# Patient Record
Sex: MALE | Race: WHITE | Employment: UNEMPLOYED | URBAN - METROPOLITAN AREA
[De-identification: names, ages, dates, MRNs, and addresses within clinical notes are randomized per-mention and may not be internally consistent; named-entity substitution may affect disease eponyms.]

---

## 2019-04-13 ENCOUNTER — HOSPITAL ENCOUNTER (EMERGENCY)
Age: 29
Discharge: HOME | End: 2019-04-13
Attending: EMERGENCY MEDICINE

## 2019-04-13 VITALS
WEIGHT: 155 LBS | HEART RATE: 112 BPM | BODY MASS INDEX: 23.49 KG/M2 | DIASTOLIC BLOOD PRESSURE: 77 MMHG | SYSTOLIC BLOOD PRESSURE: 119 MMHG | HEIGHT: 68 IN | TEMPERATURE: 97.6 F | RESPIRATION RATE: 16 BRPM | OXYGEN SATURATION: 97 %

## 2019-04-13 DIAGNOSIS — T40.1X1A ACCIDENTAL OVERDOSE OF HEROIN, INITIAL ENCOUNTER (HCC): Primary | ICD-10-CM

## 2019-04-13 LAB
ACETAMINOPHEN LEVEL: <5 MCG/ML (ref 10–30)
ALBUMIN SERPL-MCNC: 4.8 G/DL (ref 3.5–5.2)
ALP BLD-CCNC: 81 U/L (ref 40–129)
ALT SERPL-CCNC: 16 U/L (ref 0–40)
AMPHETAMINE SCREEN, URINE: POSITIVE
ANION GAP SERPL CALCULATED.3IONS-SCNC: 8 MMOL/L (ref 7–16)
AST SERPL-CCNC: 28 U/L (ref 0–39)
BARBITURATE SCREEN URINE: NOT DETECTED
BENZODIAZEPINE SCREEN, URINE: NOT DETECTED
BILIRUB SERPL-MCNC: 0.4 MG/DL (ref 0–1.2)
BUN BLDV-MCNC: 16 MG/DL (ref 6–20)
CALCIUM SERPL-MCNC: 9.4 MG/DL (ref 8.6–10.2)
CANNABINOID SCREEN URINE: NOT DETECTED
CHLORIDE BLD-SCNC: 102 MMOL/L (ref 98–107)
CO2: 27 MMOL/L (ref 22–29)
COCAINE METABOLITE SCREEN URINE: NOT DETECTED
CREAT SERPL-MCNC: 1.2 MG/DL (ref 0.7–1.2)
ETHANOL: <10 MG/DL (ref 0–0.08)
GFR AFRICAN AMERICAN: >60
GFR NON-AFRICAN AMERICAN: >60 ML/MIN/1.73
GLUCOSE BLD-MCNC: 84 MG/DL (ref 74–99)
HCT VFR BLD CALC: 44.2 % (ref 37–54)
HEMOGLOBIN: 15.4 G/DL (ref 12.5–16.5)
MCH RBC QN AUTO: 31.8 PG (ref 26–35)
MCHC RBC AUTO-ENTMCNC: 34.8 % (ref 32–34.5)
MCV RBC AUTO: 91.3 FL (ref 80–99.9)
METHADONE SCREEN, URINE: NOT DETECTED
OPIATE SCREEN URINE: NOT DETECTED
PDW BLD-RTO: 12.6 FL (ref 11.5–15)
PHENCYCLIDINE SCREEN URINE: NOT DETECTED
PLATELET # BLD: 405 E9/L (ref 130–450)
PMV BLD AUTO: 9.6 FL (ref 7–12)
POTASSIUM REFLEX MAGNESIUM: 4.2 MMOL/L (ref 3.5–5)
PROPOXYPHENE SCREEN: NOT DETECTED
RBC # BLD: 4.84 E12/L (ref 3.8–5.8)
SALICYLATE, SERUM: <0.3 MG/DL (ref 0–30)
SODIUM BLD-SCNC: 137 MMOL/L (ref 132–146)
TOTAL PROTEIN: 8.2 G/DL (ref 6.4–8.3)
TRICYCLIC ANTIDEPRESSANTS SCREEN SERUM: NEGATIVE NG/ML
WBC # BLD: 24.6 E9/L (ref 4.5–11.5)

## 2019-04-13 PROCEDURE — G0480 DRUG TEST DEF 1-7 CLASSES: HCPCS

## 2019-04-13 PROCEDURE — 99284 EMERGENCY DEPT VISIT MOD MDM: CPT

## 2019-04-13 PROCEDURE — 93005 ELECTROCARDIOGRAM TRACING: CPT

## 2019-04-13 PROCEDURE — 80307 DRUG TEST PRSMV CHEM ANLYZR: CPT

## 2019-04-13 PROCEDURE — 80053 COMPREHEN METABOLIC PANEL: CPT

## 2019-04-13 PROCEDURE — 85027 COMPLETE CBC AUTOMATED: CPT

## 2019-04-13 PROCEDURE — 36415 COLL VENOUS BLD VENIPUNCTURE: CPT

## 2019-04-13 RX ORDER — LORAZEPAM 2 MG/ML
INJECTION INTRAMUSCULAR
Status: DISCONTINUED
Start: 2019-04-13 | End: 2019-04-13 | Stop reason: WASHOUT

## 2019-04-13 RX ORDER — NALOXONE HYDROCHLORIDE 1 MG/ML
INJECTION INTRAMUSCULAR; INTRAVENOUS; SUBCUTANEOUS
Status: DISCONTINUED
Start: 2019-04-13 | End: 2019-04-13 | Stop reason: WASHOUT

## 2019-04-13 SDOH — HEALTH STABILITY: MENTAL HEALTH: HOW OFTEN DO YOU HAVE A DRINK CONTAINING ALCOHOL?: NEVER

## 2019-04-13 NOTE — ED PROVIDER NOTES
HPI:  4/13/19,   Time: 3:44 PM         Lamonte Ba is a 29 y.o. male presenting to the ED for a heroin overdose, beginning a short time   ago. The complaint has been intermittent, severe in severity, and worsened by nothing. Issue and his friends were found agonal in a car with his syringes still in their arms    ROS:   Pertinent positives and negatives are stated within HPI, all other systems reviewed and are negative.  --------------------------------------------- PAST HISTORY ---------------------------------------------  Past Medical History:  has no past medical history on file. Past Surgical History:  has a past surgical history that includes back surgery. Social History:  reports that he has been smoking cigarettes. He has been smoking about 2.00 packs per day. He has never used smokeless tobacco. He reports that he has current or past drug history. Drugs: IV, Methamphetamines, and Cocaine. He reports that he does not drink alcohol. Family History: family history is not on file. The patients home medications have been reviewed.     Allergies: Silver nitrate    -------------------------------------------------- RESULTS -------------------------------------------------  All laboratory and radiology results have been personally reviewed by myself   LABS:  Results for orders placed or performed during the hospital encounter of 04/13/19   CBC   Result Value Ref Range    WBC 24.6 (H) 4.5 - 11.5 E9/L    RBC 4.84 3.80 - 5.80 E12/L    Hemoglobin 15.4 12.5 - 16.5 g/dL    Hematocrit 44.2 37.0 - 54.0 %    MCV 91.3 80.0 - 99.9 fL    MCH 31.8 26.0 - 35.0 pg    MCHC 34.8 (H) 32.0 - 34.5 %    RDW 12.6 11.5 - 15.0 fL    Platelets 925 490 - 230 E9/L    MPV 9.6 7.0 - 12.0 fL   Comprehensive Metabolic Panel w/ Reflex to MG   Result Value Ref Range    Sodium 137 132 - 146 mmol/L    Potassium reflex Magnesium 4.2 3.5 - 5.0 mmol/L    Chloride 102 98 - 107 mmol/L    CO2 27 22 - 29 mmol/L    Anion Gap 8 7 - 16 mmol/L    Glucose 84 74 - 99 mg/dL    BUN 16 6 - 20 mg/dL    CREATININE 1.2 0.7 - 1.2 mg/dL    GFR Non-African American >60 >=60 mL/min/1.73    GFR African American >60     Calcium 9.4 8.6 - 10.2 mg/dL    Total Protein 8.2 6.4 - 8.3 g/dL    Alb 4.8 3.5 - 5.2 g/dL    Total Bilirubin 0.4 0.0 - 1.2 mg/dL    Alkaline Phosphatase 81 40 - 129 U/L    ALT 16 0 - 40 U/L    AST 28 0 - 39 U/L   Urine Drug Screen   Result Value Ref Range    Amphetamine Screen, Urine POSITIVE (A) Negative <1000 ng/mL    Barbiturate Screen, Ur NOT DETECTED Negative < 200 ng/mL    Benzodiazepine Screen, Urine NOT DETECTED Negative < 200 ng/mL    Cannabinoid Scrn, Ur NOT DETECTED Negative < 50ng/mL    Cocaine Metabolite Screen, Urine NOT DETECTED Negative < 300 ng/mL    Opiate Scrn, Ur NOT DETECTED Negative < 300ng/mL    PCP Screen, Urine NOT DETECTED Negative < 25 ng/mL    Methadone Screen, Urine NOT DETECTED Negative <300 ng/mL    Propoxyphene Scrn, Ur NOT DETECTED Negative <300 ng/mL   Serum Drug Screen   Result Value Ref Range    Ethanol Lvl <10 mg/dL    Acetaminophen Level <5.0 (L) 10.0 - 06.2 mcg/mL    Salicylate, Serum <8.6 0.0 - 30.0 mg/dL    TCA Scrn NEGATIVE Cutoff:300 ng/mL       RADIOLOGY:  Interpreted by Radiologist.  No orders to display       ------------------------- NURSING NOTES AND VITALS REVIEWED ---------------------------   The nursing notes within the ED encounter and vital signs as below have been reviewed.    /77   Pulse 112   Temp 97.6 °F (36.4 °C)   Resp 16   Ht 5' 8\" (1.727 m)   Wt 155 lb (70.3 kg)   SpO2 97%   BMI 23.57 kg/m²   Oxygen Saturation Interpretation: Normal      ---------------------------------------------------PHYSICAL EXAM--------------------------------------      Constitutional/General: Alert and oriented x3, well appearing, non toxic in NAD  Head: NC/AT  Eyes: PERRL, EOMI  Mouth: Oropharynx clear, handling secretions, no trismus  Neck: Supple, full ROM, no meningeal signs  Pulmonary: Lungs clear to auscultation bilaterally, no wheezes, rales, or rhonchi. Not in respiratory distress  Cardiovascular:  Tachycardic rate and rhythm, no murmurs, gallops, or rubs. 2+ distal pulses  Abdomen: Soft, non tender, non distended,   Extremities: Moves all extremities x 4. Warm and well perfused  Skin: warm and dry without rash  Neurologic: GCS 15,  Psych: Anxious affect belligerent mood      ------------------------------ ED COURSE/MEDICAL DECISION MAKING----------------------  Medications - No data to display      Medical Decision Making:    Patient was observed in the emergency department for a period of time until it was clear that he had clinically improved and he was then discharged to follow-up in the medicine clinic. He is offered referral for rehab but he was not interested stating that he has prior arrangements to go to a methadone clinic. Counseling: The emergency provider has spoken with the patient and discussed todays results, in addition to providing specific details for the plan of care and counseling regarding the diagnosis and prognosis. Questions are answered at this time and they are agreeable with the plan. EKG: This EKG is signed and interpreted by me. Rate: 124  Rhythm: Sinus  Interpretation: sinus tachycardia  Comparison: no previous EKG available    --------------------------------- IMPRESSION AND DISPOSITION ---------------------------------    IMPRESSION  1.  Accidental overdose of heroin, initial encounter [facility])        DISPOSITION  Disposition: Discharge to home  Patient condition is stable                 Lisa Cervantes MD  04/13/19 [id number]

## 2019-04-13 NOTE — ED NOTES
Pt lying in bed with eyes open, speech slurred, oriented to person and time only, unaware he got here why he is here     Myles Tanner RN  04/13/19 [id number]

## 2019-04-13 NOTE — ED NOTES
Pt sitting on side of bed RN advised pt to get back into bed so he did not fall pt is redirectable but wants to make phone call     Heidi Solomon RN  04/13/19 [id number]

## 2019-04-18 LAB
EKG ATRIAL RATE: 124 BPM
EKG P AXIS: 70 DEGREES
EKG P-R INTERVAL: 142 MS
EKG Q-T INTERVAL: 314 MS
EKG QRS DURATION: 82 MS
EKG QTC CALCULATION (BAZETT): 451 MS
EKG R AXIS: 89 DEGREES
EKG T AXIS: 40 DEGREES
EKG VENTRICULAR RATE: 124 BPM

## 2019-04-20 LAB
AMPHETAMINES, URINE: >5000 NG/ML
METHAMPHETAMINE, URINE: NORMAL NG/ML
METHYLENEDIOXYAMPHETAMINE, UR: 297 NG/ML
METHYLENEDIOXYETHYLAMPHETAMINE, UR: <200 NG/ML
METHYLENEDIOXYMETHAMPHETAMINE, UR: NORMAL NG/ML